# Patient Record
Sex: FEMALE | Race: WHITE | Employment: OTHER | URBAN - METROPOLITAN AREA
[De-identification: names, ages, dates, MRNs, and addresses within clinical notes are randomized per-mention and may not be internally consistent; named-entity substitution may affect disease eponyms.]

---

## 2020-01-14 ENCOUNTER — HOSPITAL ENCOUNTER (OUTPATIENT)
Dept: PHYSICAL THERAPY | Age: 70
Discharge: HOME | End: 2020-01-14
Payer: MEDICARE

## 2020-01-14 PROCEDURE — 97110 THERAPEUTIC EXERCISES: CPT

## 2020-01-14 PROCEDURE — 97162 PT EVAL MOD COMPLEX 30 MIN: CPT

## 2020-01-14 NOTE — PROGRESS NOTES
Madeline Gutierrez 31  [facility] [place] [facility]  [facility]  [place] [facility], [address] [place] [phone], [address]  Phone: [phone]  Fax: [phone] / [address] [facility]  Patient Name: Mercy Mantilla : 1950   Medical   Diagnosis: Low back pain [M54.5] Treatment Diagnosis: LBP   Onset Date: >1 year     Referral Source: Doug Gregory Start of Care [facility]): 2020   Prior Hospitalization: See medical history Provider #: [id number]   Prior Level of Function: Able to walk community distances without an AD   Comorbidities: HX dizziness   Medications: Verified on Patient Summary List   The Plan of Care and following information is based on the information from the initial evaluation.   ===========================================================================================  Assessment / key information: Patient is a 71 y.o. female who presents to [facility] with diagnosis of LBP and imbalance. The patient reports chronic HX of LBP that has progressively worsened. She reports pain is worse when walking and static standing. She has started using a cane when walking longer distances (>100ft) due to LBP and gait instability. She reports she \"waddles\" when walking and often feels dizzy when up and moving around. She also reports episodes of R LE radicular pain 1-2x/week when walking but is able to alleviate SX if she sits for 10-15 min. She also reports B knee pain (R>L) and has received cortisone injections in R knee, most recent . Objective PT examination findings include:   Movement Assessment: amb with inc ELLEN, fair balance during transfers  AROM: L/S AROM flex 50%, ext 25% p!, B SB 40% R ERP with poor lumbar curve reversal  MMT: fair TA recruitment in H/L, B hip abd/ER 3+/5, poor segmental recruitment during H/L  Special Tests: + R Slump/SLR for neural tension, B SLS <3 sec, B mSR <10sec with poor balance strategy, SX reproduction during horizontal head turns with and without visual fixation in standing    A home exercise program was demonstrated and provided to address the above objective and functional deficits. Patient can benefit from skilled PT interventions to improve strength, decrease pain, to facilitate performance of ADLs & improve overall functional status.   ===========================================================================================  Eval Complexity: History MEDIUM  Complexity : 1-2 comorbidities / personal factors will impact the outcome/ POC ;  Examination  MEDIUM Complexity : 3 Standardized tests and measures addressing body structure, function, activity limitation and / or participation in recreation ; Presentation MEDIUM Complexity : Evolving with changing characteristics ; Decision Making MEDIUM Complexity : FOTO score of 26-74; Overall Complexity MEDIUM  Problem List: pain affecting function, decrease ROM, decrease strength, impaired gait/ balance, decrease ADL/ functional abilitiies, decrease activity tolerance, decrease flexibility/ joint mobility and decrease transfer abilities, FOTO score 40  Treatment Plan may include any combination of the following: Therapeutic exercise, Therapeutic activities, Neuromuscular re-education, Physical agent/modality, Gait/balance training, Manual therapy including dry needling, Aquatic therapy and Patient education  Patient / Family readiness to learn indicated by: asking questions, trying to perform skills and interest  Persons(s) to be included in education: patient (P)  Barriers to Learning/Limitations: no  Measures taken:    Patient Goal (s): \"reduction in pain, walk w/o cane\"   Patient self reported health status: fair  Rehabilitation Potential: good   Short Term Goals: To be accomplished in  1-2  weeks:  1) Patient to be independent and compliant with initial HEP to prevent further disability.     2) Patient to improve TA strength in order to perform and maintain ab draw during bed mobility. 3) Patient will report decreased c/o pain to < or = 2/10 to facilitate ease with standing 10 min with manageable sx in L/S.   Long Term Goals: To be accomplished in  3-4  weeks:  1) Patient to be independent & compliant with a progressive, high level HEP in order to maintain gains made in physical therapy. 2) Improve FOTO score to 54 indicating significant functional improvement in order to return to PLOF and community ambulation. 3) Able to walk 10ft without an AD and no inc in LBP indicating ability to return to community ambulation. Frequency / Duration:   Patient to be seen  2-3  times per week for 3-4  weeks:  Patient / Caregiver education and instruction: self care, activity modification and exercises    Therapist Signature: Madhavi Collazo PT, DPT, MTC, CMTPT Date: 8/84/4220   Certification Period: 1/14/2020 to 4/12/2020 Time: 3:07 PM   ===========================================================================================  I certify that the above Physical Therapy Services are being furnished while the patient is under my care. I agree with the treatment plan and certify that this therapy is necessary. Physician Signature:        Date:       Time:     Please sign and return to [facility] or you may fax the signed copy to [phone]. Thank you.

## 2020-01-14 NOTE — PROGRESS NOTES
PHYSICAL THERAPY - DAILY TREATMENT NOTE    Patient Name: Brandon Contreras        Date: 2020  : 1950   YES Patient  Verified  Visit #:     Insurance: Payor: Zackary Richardson / Plan: [address] [place] [address] HMO / Product Type: Managed Care Medicare /      In time: 1010 Out time: 1100   Total Treatment Time: 50     BCBS/Medicare Time Tracking (below)   Total Timed Codes (min):  50 1:1 Treatment Time:  50     TREATMENT AREA =  Low back pain [M54.5]    SUBJECTIVE  Pain Level (on 0 to 10 scale):  6  / 10   Medication Changes/New allergies or changes in medical history, any new surgeries or procedures? NO    If yes, update Summary List   Subjective Functional Status/Changes:  []  No changes reported   The patient reports chronic HX of LBP that has progressively worsened. She reports pain is worse when walking and static standing. She has started using a cane when walking longer distances (>100ft) due to LBP and gait instability. She reports she \"waddles\" when walking and often feels dizzy when up and moving around. She also reports episodes of R LE radicular pain 1-2x/week when walking but is able to alleviate SX if she sits for 10-15 min. She also reports B knee pain (R>L) and has received cortisone injections in R knee, most recent .          Modalities Rationale:     decrease inflammation, decrease pain and increase tissue extensibility to improve patient's ability to perform ADLs   min [] Estim, type/location:                                      []  att     []  unatt     []  w/US     []  w/ice    []  w/heat    min []  Mechanical Traction: type/lbs                   []  pro   []  sup   []  int   []  cont    []  before manual    []  after manual    min []  Ultrasound, settings/location:      min []  Iontophoresis w/ dexamethasone, location:                                               []  take home patch       []  in clinic    min []  Ice     []  Heat    location/position:     min [] Vasopneumatic Device, press/temp:     min []  Other:    [x] Skin assessment post-treatment (if applicable):    [x]  intact    [x]  redness- no adverse reaction     []redness  adverse reaction:        10 min Therapeutic Exercise:  [x]  See flow sheet   Rationale:      increase ROM and increase strength to improve the patients ability to perform unlimited ADLs       Billed With/As:   [] TE   [] TA   [] Neuro   [] Self Care Patient Education: [x] Review HEP    [] Progressed/Changed HEP based on:   [] positioning   [] body mechanics   [] transfers   [] heat/ice application    [x] other: Issued written HEP and reviewed     Other Objective/Functional Measures: Movement Assessment: amb with inc ELLEN, fair balance during transfers  AROM: L/S AROM flex 50%, ext 25% p!, B SB 40% with poor lumbar curve reversal  MMT: fair TA recruitment in H/L, B hip abd/ER 3+/5, poor segmental recruitment during H/L  Special Tests: B SLS <3 sec, B mSR <10sec with poor balance strategy, SX reproduction during horizontal head turns with and without visual fixation in standing     Post Treatment Pain Level (on 0 to 10) scale:   6  / 10     ASSESSMENT  Assessment/Changes in Function:     See POC     []  See Progress Note/Recertification   Patient will continue to benefit from skilled PT services to modify and progress therapeutic interventions, address functional mobility deficits, address ROM deficits, address strength deficits, analyze and address soft tissue restrictions, analyze and cue movement patterns, analyze and modify body mechanics/ergonomics, assess and modify postural abnormalities, address imbalance/dizziness and instruct in home and community integration to attain remaining goals.    Progress toward goals / Updated goals:    Progressing towards newly established goals in [address] [facility] [address]  [x]  Upgrade activities as tolerated YES Continue plan of care   []  Discharge due to :    []  Other:      Therapist: Madhavi Collazo, PT, DPT, MTC, CMTPT    Date: 1/14/2020 Time: 10:14 AM     No future appointments.

## 2020-01-21 ENCOUNTER — HOSPITAL ENCOUNTER (OUTPATIENT)
Dept: PHYSICAL THERAPY | Age: 70
Discharge: HOME | End: 2020-01-21
Payer: MEDICARE

## 2020-01-21 PROCEDURE — 97110 THERAPEUTIC EXERCISES: CPT

## 2020-01-21 NOTE — PROGRESS NOTES
PHYSICAL THERAPY - DAILY TREATMENT NOTE    Patient Name: Ingrid Wakefield        Date: 2020  : 1950   YES Patient  Verified  Visit #:      of   12  Insurance: Payor: Bobby Huerta / Plan: [address] [place] [address] HMO / Product Type: Managed Care Medicare /      In time: 11:05 A Out time: 12:05 P   Total Treatment Time: 55     BCBS/Medicare Time Tracking (below)   Total Timed Codes (min):  45 1:1 Treatment Time:  30     TREATMENT AREA =  Low back pain [M54.5]  Gait instability [R26.81]    SUBJECTIVE  Pain Level (on 0 to 10 scale):  0  / 10   Medication Changes/New allergies or changes in medical history, any new surgeries or procedures? NO    If yes, update Summary List   Subjective Functional Status/Changes:  []  No changes reported     Patient reports that she is not having much pain prior to therapy. She reports that the pain gets worse when she walks for longer distances. Patient reports that she uses a [facility] for longer distances. Modalities Rationale:     decrease pain and increase tissue extensibility to improve patient's ability to walk longer distances with reduced pain.    min [] Estim, type/location:                                      []  att     []  unatt     []  w/US     []  w/ice    []  w/heat    min []  Mechanical Traction: type/lbs                   []  pro   []  sup   []  int   []  cont    []  before manual    []  after manual    min []  Ultrasound, settings/location:      min []  Iontophoresis w/ dexamethasone, location:                                               []  take home patch       []  in clinic   10 min []  Ice     [x]  Heat    location/position: Supine lumbar spine    min []  Vasopneumatic Device, press/temp:     min []  Other:    [x] Skin assessment post-treatment (if applicable):    [x]  intact    [x]  redness- no adverse reaction     []redness  adverse reaction:        45/ 30 min Therapeutic Exercise:  [x]  See flow sheet   Rationale:      increase ROM and increase strength to improve the patients ability to perform ADLs with reduced pain. Billed With/As:   [] TE   [] TA   [] Neuro   [] Self Care Patient Education: [x] Review HEP    [] Progressed/Changed HEP based on:   [] positioning   [] body mechanics   [] transfers   [] heat/ice application    [] other:      Other Objective/Functional Measures:    Initiated exercises per flowsheet. Added PPT, SB isometrics, and clamshells to address core stability and hip strengthening. Post Treatment Pain Level (on 0 to 10) scale:   0  / 10     ASSESSMENT  Assessment/Changes in Function:     Good tolerance to exercises today with no c/o pain following treatment. Patient required verbal and tactile cueing for proper initiation of TA draw and PPT. Cueing also needed for proper form and performance of exercises. []  See Progress Note/Recertification   Patient will continue to benefit from skilled PT services to modify and progress therapeutic interventions, address functional mobility deficits, address ROM deficits, address strength deficits, analyze and cue movement patterns, analyze and modify body mechanics/ergonomics, assess and modify postural abnormalities and instruct in home and community integration to attain remaining goals. Progress toward goals / Updated goals:    Progressing toward STGs 2 & 3.      PLAN  [x]  Upgrade activities as tolerated YES Continue plan of care   []  Discharge due to :    []  Other:      Therapist: Samantha Redman    Date: 1/21/2020 Time: 2:44 PM     Future Appointments   Date Time Provider Joao Mann   1/24/2020 10:30 AM Cm [facility]   1/27/2020 12:30 PM [facility]m [facility]   1/30/2020 12:00 PM Enmanuel Foley [facility]

## 2020-01-24 ENCOUNTER — HOSPITAL ENCOUNTER (OUTPATIENT)
Dept: PHYSICAL THERAPY | Age: 70
Discharge: HOME | End: 2020-01-24
Payer: MEDICARE

## 2020-01-24 PROCEDURE — 97112 NEUROMUSCULAR REEDUCATION: CPT

## 2020-01-24 NOTE — PROGRESS NOTES
PHYSICAL THERAPY - DAILY TREATMENT NOTE    Patient Name: Theresa Lopez        Date: 2020  : 1950   YES Patient  Verified  Visit #:   3   of   12  Insurance: Payor: Annette Simmonds / Plan: [address] [place] [address] HMO / Product Type: Managed Care Medicare /      In time: 10:30 A Out time: 11:10 A   Total Treatment Time: 40     BCBS/Medicare Time Tracking (below)   Total Timed Codes (min):  40 1:1 Treatment Time:  40     TREATMENT AREA = Low back pain [M54.5]  Gait instability [R26.81]    SUBJECTIVE  Pain Level (on 0 to 10 scale):  0  / 10   Medication Changes/New allergies or changes in medical history, any new surgeries or procedures? NO    If yes, update Summary List   Subjective Functional Status/Changes:  []  No changes reported     Patient reports dizziness is intermittent in nature & now more frequent in nature, it worsens when she moves her head changes her position too quickly.             Modalities Rationale:    Patient deferred   min [] Estim, type/location:                                      []  att     []  unatt     []  w/US     []  w/ice    []  w/heat    min []  Mechanical Traction: type/lbs                   []  pro   []  sup   []  int   []  cont    []  before manual    []  after manual    min []  Ultrasound, settings/location:      min []  Iontophoresis w/ dexamethasone, location:                                               []  take home patch       []  in clinic    min []  Ice     []  Heat    location/position:     min []  Vasopneumatic Device, press/temp:     min []  Other:    [] Skin assessment post-treatment (if applicable):    []  intact    []  redness- no adverse reaction     []redness  adverse reaction:        40 min NM re-ed:  [x]  See flow sheet   Rationale:      improve coordination, improve balance and increase proprioception to improve the patients ability to return to symptom-free ADLs     Billed With/As:   [] TE   [] TA   [] Neuro   [] Self Care Patient Education: [x] Review HEP    [] Progressed/Changed HEP based on:   [] positioning   [] body mechanics   [] transfers   [] heat/ice application    [] other:      Other Objective/Functional Measures:    [place]ane 49/56  FGTONA 20/30  Reviewed current treatment & HEP as well as symptom management at home     Post Treatment Pain Level (on 0 to 10) scale:   0  / 10     ASSESSMENT  Assessment/Changes in Function:     Good tolerance to initiation of vest adaptation exercises, increased symptoms with HHT     []  See Progress Note/Recertification   Patient will continue to benefit from skilled PT services to modify and progress therapeutic interventions, address functional mobility deficits, address ROM deficits, address strength deficits, assess and modify postural abnormalities, address imbalance/dizziness and instruct in home and community integration to attain remaining goals.    Progress toward goals / Updated goals:    Progressing towards STG 1     PLAN  []  Upgrade activities as tolerated YES Continue plan of care   []  Discharge due to :    []  Other:      Therapist: Yuki Wells, PT    Date: 1/24/2020 Time: 10:37 AM     Future Appointments   Date Time Provider Joao Mann   1/27/2020 12:30 PM Brenden Monzon [facility]   1/30/2020 12:00 PM Shantell Davis [facility]

## 2020-01-27 ENCOUNTER — HOSPITAL ENCOUNTER (OUTPATIENT)
Dept: PHYSICAL THERAPY | Age: 70
Discharge: HOME | End: 2020-01-27
Payer: MEDICARE

## 2020-01-27 ENCOUNTER — APPOINTMENT (OUTPATIENT)
Dept: PHYSICAL THERAPY | Age: 70
End: 2020-01-27
Payer: MEDICARE

## 2020-01-27 PROCEDURE — 97110 THERAPEUTIC EXERCISES: CPT

## 2020-01-27 PROCEDURE — 97112 NEUROMUSCULAR REEDUCATION: CPT

## 2020-01-27 NOTE — PROGRESS NOTES
PHYSICAL THERAPY - DAILY TREATMENT NOTE    Patient Name: Sam Dey        Date: 2020  : 1950   YES Patient  Verified  Visit #:      12  Insurance: Payor: Christie Timothy / Plan: [address] [place] [address] HMO / Product Type: Managed Care Medicare /      In time: 12:30 P Out time: 11:40 A   Total Treatment Time: 65     BCBS/Medicare Time Tracking (below)   Total Timed Codes (min):  60 1:1 Treatment Time:  40     TREATMENT AREA = Low back pain [M54.5]  Gait instability [R26.81]    SUBJECTIVE  Pain Level (on 0 to 10 scale):  0  / 10   Medication Changes/New allergies or changes in medical history, any new surgeries or procedures? NO    If yes, update Summary List   Subjective Functional Status/Changes:  []  No changes reported     Patient reports she is doing VSE card exercise bid.           Modalities Rationale:     decrease pain to improve patient's ability to return to pain-free ADLs   min [] Estim, type/location:                                      []  att     []  unatt     []  w/US     []  w/ice    []  w/heat    min []  Mechanical Traction: type/lbs                   []  pro   []  sup   []  int   []  cont    []  before manual    []  after manual    min []  Ultrasound, settings/location:      min []  Iontophoresis w/ dexamethasone, location:                                               []  take home patch       []  in clinic   5 min []  Ice     [x]  Heat    location/position: Supine to l/s     min []  Vasopneumatic Device, press/temp:     min []  Other:    [] Skin assessment post-treatment (if applicable):    [x]  intact    []  redness- no adverse reaction     []redness  adverse reaction:        35/  15 min Therapeutic Exercise:  [x]  See flow sheet   Rationale:      increase ROM and increase strength to improve the patients ability to return to pain-free standing     25 min Neuromuscular Re-ed: [x]  See flow sheet   Rationale:    increase ROM, increase strength, improve balance and increase proprioception to improve the patients ability to return to ADLs with manageable symptoms     Billed With/As:   [] TE   [] TA   [] Neuro   [] Self Care Patient Education: [x] Review HEP    [] Progressed/Changed HEP based on:   [] positioning   [] body mechanics   [] transfers   [] heat/ice application    [] other:      Other Objective/Functional Measures: Added static balance exercises per flow sheet      Post Treatment Pain Level (on 0 to 10) scale:   0  / 10     ASSESSMENT  Assessment/Changes in Function:     Good tolerance to initial program      []  See Progress Note/Recertification   Patient will continue to benefit from skilled PT services to modify and progress therapeutic interventions, address functional mobility deficits, address ROM deficits, address strength deficits, analyze and modify body mechanics/ergonomics, assess and modify postural abnormalities, address imbalance/dizziness and instruct in home and community integration to attain remaining goals.    Progress toward goals / Updated goals:    Progressing towards STG 2, 3      PLAN  []  Upgrade activities as tolerated YES Continue plan of care   []  Discharge due to :    []  Other:      Therapist: Susan Antony PT    Date: 1/27/2020 Time: 3:48 PM     Future Appointments   Date Time Provider Joao Mann   1/30/2020 12:00 PM [facility]

## 2020-01-28 ENCOUNTER — APPOINTMENT (OUTPATIENT)
Dept: PHYSICAL THERAPY | Age: 70
End: 2020-01-28
Payer: MEDICARE

## 2020-01-30 ENCOUNTER — HOSPITAL ENCOUNTER (OUTPATIENT)
Dept: PHYSICAL THERAPY | Age: 70
Discharge: HOME | End: 2020-01-30
Payer: MEDICARE

## 2020-01-30 ENCOUNTER — APPOINTMENT (OUTPATIENT)
Dept: PHYSICAL THERAPY | Age: 70
End: 2020-01-30
Payer: MEDICARE

## 2020-01-30 PROCEDURE — 97110 THERAPEUTIC EXERCISES: CPT

## 2020-01-30 PROCEDURE — 97112 NEUROMUSCULAR REEDUCATION: CPT

## 2020-01-30 NOTE — PROGRESS NOTES
PHYSICAL THERAPY - DAILY TREATMENT NOTE    Patient Name: Jasmyne Vincent        Date: 2020  : 1950   YES Patient  Verified  Visit #:     Insurance: Payor: Eri Evans / Plan: [address] [place] [address] HMO / Product Type: Managed Care Medicare /      In time: 1200 Out time: 85   Total Treatment Time: 55     BCBS/Medicare Time Tracking (below)   Total Timed Codes (min):  55 1:1 Treatment Time:  40     TREATMENT AREA =  Low back pain [M54.5]  Gait instability [R26.81]    SUBJECTIVE  Pain Level (on 0 to 10 scale):  0  / 10   Medication Changes/New allergies or changes in medical history, any new surgeries or procedures? NO    If yes, update Summary List   Subjective Functional Status/Changes:  []  No changes reported     Patient reports that she was able to walk in the grocery store after last visit without a cart without pain.             Modalities Rationale:   PD    min [] Estim, type/location:                                      []  att     []  unatt     []  w/US     []  w/ice    []  w/heat    min []  Mechanical Traction: type/lbs                   []  pro   []  sup   []  int   []  cont    []  before manual    []  after manual    min []  Ultrasound, settings/location:      min []  Iontophoresis w/ dexamethasone, location:                                               []  take home patch       []  in clinic    min []  Ice     []  Heat    location/position:     min []  Vasopneumatic Device, press/temp:     min []  Other:    [x] Skin assessment post-treatment (if applicable):    [x]  intact    [x]  redness- no adverse reaction     []redness  adverse reaction:        40/25 min Therapeutic Exercise:  [x]  See flow sheet   Rationale:      increase ROM, increase strength, improve coordination, improve balance and increase proprioception to improve the patients ability to perform unlimited ADLs       15 min Neuromuscular Re-ed: [x]  See flow sheet   Rationale:    increase ROM, increase strength, improve coordination, improve balance and increase proprioception to improve the patients ability to perform unlimited ambulation        Billed With/As:   [] TE   [] TA   [] Neuro   [] Self Care Patient Education: [x] Review HEP    [] Progressed/Changed HEP based on:   [] positioning   [] body mechanics   [] transfers   [] heat/ice application    [] other:      Other Objective/Functional Measures: Added gait with HHT, and VHT  Progressed VSE to sitting  Progressed various balance exercises per flow sheet     Post Treatment Pain Level (on 0 to 10) scale:   0  / 10     ASSESSMENT  Assessment/Changes in Function:     Patient is making good progress with PT rx with no increase in overall pain with exercises. She is most challenged with balance exercises focused on challenged the vestibular system     []  See Progress Note/Recertification   Patient will continue to benefit from skilled PT services to modify and progress therapeutic interventions, address functional mobility deficits, address ROM deficits, address strength deficits, analyze and address soft tissue restrictions, analyze and cue movement patterns, analyze and modify body mechanics/ergonomics, assess and modify postural abnormalities, address imbalance/dizziness and instruct in home and community integration to attain remaining goals. Progress toward goals / Updated goals:    Progressing towards LTG 2.       PLAN  [x]  Upgrade activities as tolerated YES Continue plan of care   []  Discharge due to :    []  Other:      Therapist: John Rodriguez    Date: 1/30/2020 Time: 1:21 PM     Future Appointments   Date Time Provider Joao Mann   2/3/2020 11:00 AM [facility]   2/6/2020  2:30 PM [facility]   2/11/2020 11:30 AM [facility]   2/13/2020  2:30 PM [facility]   2/17/2020  2:30 PM [facility]   2/20/2020  2:30 PM L[facility] 2/24/2020 11:00 AM [facility]   [facility]

## 2020-02-03 ENCOUNTER — HOSPITAL ENCOUNTER (OUTPATIENT)
Dept: PHYSICAL THERAPY | Age: 70
Discharge: HOME | End: 2020-02-03
Payer: MEDICARE

## 2020-02-03 PROCEDURE — 97110 THERAPEUTIC EXERCISES: CPT

## 2020-02-03 PROCEDURE — 97112 NEUROMUSCULAR REEDUCATION: CPT

## 2020-02-03 NOTE — PROGRESS NOTES
PHYSICAL THERAPY - DAILY TREATMENT NOTE    Patient Name: Ascencion Johnson        Date: 2/3/2020  : 1950   YES Patient  Verified  Visit #:     Insurance: Payor: Marci Maureen / Plan: [address] [place] [address] HMO / Product Type: Managed Care Medicare /      In time: 1100 Out time: 4286   Total Treatment Time: 45     BCBS/Medicare Time Tracking (below)   Total Timed Codes (min):  45 1:1 Treatment Time:  25     TREATMENT AREA =  Low back pain [M54.5]  Gait instability [R26.81]    SUBJECTIVE  Pain Level (on 0 to 10 scale):  0  / 10   Medication Changes/New allergies or changes in medical history, any new surgeries or procedures? NO    If yes, update Summary List   Subjective Functional Status/Changes:  []  No changes reported     Patient reports that she was again able to walk for about 45 minutes after therapy last time without a cane or having to reach out for objects.             Modalities Rationale:    PD    min [] Estim, type/location:                                      []  att     []  unatt     []  w/US     []  w/ice    []  w/heat    min []  Mechanical Traction: type/lbs                   []  pro   []  sup   []  int   []  cont    []  before manual    []  after manual    min []  Ultrasound, settings/location:      min []  Iontophoresis w/ dexamethasone, location:                                               []  take home patch       []  in clinic    min []  Ice     []  Heat    location/position:     min []  Vasopneumatic Device, press/temp:     min []  Other:    [x] Skin assessment post-treatment (if applicable):    [x]  intact    [x]  redness- no adverse reaction     []redness  adverse reaction:        30/10 min Therapeutic Exercise:  [x]  See flow sheet   Rationale:      increase ROM, increase strength, improve coordination, improve balance and increase proprioception to improve the patients ability to perform unlimited ADLs       15 min Neuromuscular Re-ed: [x]  See flow sheet Rationale:    increase ROM, increase strength, improve coordination, improve balance and increase proprioception to improve the patients ability to perform unlimited ADLs         Billed With/As:   [] TE   [] TA   [] Neuro   [] Self Care Patient Education: [x] Review HEP    [] Progressed/Changed HEP based on:   [] positioning   [] body mechanics   [] transfers   [] heat/ice application    [] other:      Other Objective/Functional Measures:    Progressed VVI to standing, VSE to standing with decreased base of support. Required rest breaks between each set due to increase in sx. Progressed to MSR with EC on firm ground     Post Treatment Pain Level (on 0 to 10) scale:   0  / 10     ASSESSMENT  Assessment/Changes in Function:     Patient is making good progress with balance and core stability exercises with no increase in overall pain. She is showing slight improvements in use of hip balance strategies. []  See Progress Note/Recertification   Patient will continue to benefit from skilled PT services to modify and progress therapeutic interventions, address functional mobility deficits, address ROM deficits, address strength deficits, analyze and address soft tissue restrictions, analyze and cue movement patterns, analyze and modify body mechanics/ergonomics, assess and modify postural abnormalities, address imbalance/dizziness and instruct in home and community integration to attain remaining goals. Progress toward goals / Updated goals:    Progressing towards LTG 2.       PLAN  [x]  Upgrade activities as tolerated YES Continue plan of care   []  Discharge due to :    []  Other:      Therapist: John Rodriguez    Date: 2/3/2020 Time: 12:18 PM     Future Appointments   Date Time Provider Joao Mann   2/6/2020  2:30 PM [facility]   2/11/2020 11:30 AM [facility]   2/13/2020  2:30 PM [facility]   [facility]17/2020  [facility]/20/2020  2:30 PM [facility]   2/24/2020 11:00 AM [facility]   2/27/2020 11:30 AM [facility]

## 2020-02-06 ENCOUNTER — APPOINTMENT (OUTPATIENT)
Dept: PHYSICAL THERAPY | Age: 70
End: 2020-02-06
Payer: MEDICARE

## 2020-02-11 ENCOUNTER — HOSPITAL ENCOUNTER (OUTPATIENT)
Dept: PHYSICAL THERAPY | Age: 70
Discharge: HOME | End: 2020-02-11
Payer: MEDICARE

## 2020-02-11 PROCEDURE — 97112 NEUROMUSCULAR REEDUCATION: CPT

## 2020-02-11 NOTE — PROGRESS NOTES
Madeline Gutierrez [address]  [facility] PHYSICAL THERAPY  [employer]  [facility], [address] ,[address], [address] [facility]  Phone: [phone]  Fax: [phone]  PROGRESS NOTE  Patient Name: Jhoana Tidwell : 1950   Treatment/Medical Diagnosis: Low back pain [M54.5]  Gait instability [R26.81]   Referral Source: Rebecca Rodriguez,*     Date of Initial Visit: 2020 Attended Visits: 7 Missed Visits: 1     SUMMARY OF TREATMENT  Therapeutic exercise to increase core stability, lumbar flexibility, LE strength, postural control. Neuromuscular re-education to improve balance and decrease symptoms in order to perform ADLs and dressing. CURRENT STATUS  Ms. Yi is making good progress with physical therapy treatment. She continues to have unsteadiness on her feet as well as complaints of frequent, intermittent dizziness throughout the day associated with quick position changes as well as head turns. She is tolerated VSE and VVI card exercises in standing well but does require UE support. She has slow improvements in use of hip and ankle balance strategy but tends to rely heavily on UE and stepping strategy. Today FGA score is 21/30, unable to maintain SR for > 3 seconds bilaterally, balance EC on foam: 30s with increased sway requiring SBA. Overall she reports her lower back pain has improved and she is able to walk short distances (grocery shopping) without use of SPC or cart because her lower back is bothering her. Patient agreeable to focus second half of therapy on imbalance. Goal/Measure of Progress Goal Met? 1. Patient to be independent & compliant with a progressive, high level HEP in order to maintain gains made in physical therapy. Status at last Eval: established Current Status: I and compliant  yes   2. Improve FOTO score to 54 indicating significant functional improvement in order to return to PLOF and community ambulation.    Status at last Eval: 40 Current Status: 63 yes   3. Able to walk 10ft without an AD and no inc in LBP indicating ability to return to community ambulation. Status at last Eval: unable Current Status: Able to walk 30ft x 10 without AD without LBP yes     New Goals to be achieved in __4-6__  weeks:  1. Patient will be I and compliant with a vestibular card exercise program 3x a day in order to prevent further symptoms. 2.  Patient will increase FGA score to 24/30 in order to improve ease with community ambulation   3. Patient will be able to maintain SR for 16 seconds with good use of hip balance strategy in order to prevent falls within the home. 4.  Patient will report >/= 50% improvement in overall sx in order to allow for ease with ADLs. RECOMMENDATIONS  Patient to be seen 2x a week for 4-6 weeks to work towards LTG. If you have any questions/comments please contact us directly at [phone]. Thank you for allowing us to assist in the care of your patient. Therapist Signature: Kuldip Stoner PT DPT Date: 2/11/2020     Time: 11:38 AM   NOTE TO PHYSICIAN:  PLEASE COMPLETE THE ORDERS BELOW AND FAX TO   [facility]: [phone]. If you are unable to process this request in 24 hours please contact our office: [phone].    ___ I have read the above report and request that my patient continue as recommended.   ___ I have read the above report and request that my patient continue therapy with the following changes/special instructions:_________________________________________________________   ___ I have read the above report and request that my patient be discharged from therapy.      Physician Signature:        Date:       Time:

## 2020-02-11 NOTE — PROGRESS NOTES
PHYSICAL THERAPY - DAILY TREATMENT NOTE    Patient Name: Butch Sanderson        Date: 2020  : 1950   YES Patient  Verified  Visit #:     Insurance: Payor: Rakel Stephener / Plan: [address] [place] [address] HMO / Product Type: Managed Care Medicare /      In time:  Out time:    Total Treatment Time: 40     BCBS/Medicare Time Tracking (below)   Total Timed Codes (min):  40 1:1 Treatment Time:  25     TREATMENT AREA =  Low back pain [M54.5]  Gait instability [R26.81]    SUBJECTIVE  Pain Level (on 0 to 10 scale):  0  / 10   Medication Changes/New allergies or changes in medical history, any new surgeries or procedures? NO    If yes, update Summary List   Subjective Functional Status/Changes:  []  No changes reported     Patient reports that her back has been feeling better but today is an off day in regards to her dizziness.             Modalities Rationale:   NA    min [] Estim, type/location:                                      []  att     []  unatt     []  w/US     []  w/ice    []  w/heat    min []  Mechanical Traction: type/lbs                   []  pro   []  sup   []  int   []  cont    []  before manual    []  after manual    min []  Ultrasound, settings/location:      min []  Iontophoresis w/ dexamethasone, location:                                               []  take home patch       []  in clinic    min []  Ice     []  Heat    location/position:     min []  Vasopneumatic Device, press/temp:     min []  Other:    [x] Skin assessment post-treatment (if applicable):    [x]  intact    [x]  redness- no adverse reaction     []redness  adverse reaction:        10 NB min Therapeutic Exercise:  [x]  See flow sheet   Rationale:      increase ROM, increase strength, improve coordination, improve balance and increase proprioception to improve the patients ability to perform unlimited ADLs     30/25 min Neuromuscular Re-ed: [x]  See flow sheet   Rationale:    increase ROM, increase strength, improve coordination, improve balance and increase proprioception to improve the patients ability to perform unlimited ADLs     Billed With/As:   [] TE   [] TA   [] Neuro   [] Self Care Patient Education: [x] Review HEP    [] Progressed/Changed HEP based on:   [] positioning   [] body mechanics   [] transfers   [] heat/ice application    [] other:      Other Objective/Functional Measures:    FGA: 21/30     Post Treatment Pain Level (on 0 to 10) scale:   0  / 10     ASSESSMENT  Assessment/Changes in Function:     See PN, patient is making good progress with PT rx with slow improvements in overall use of hip balance strategy. Unable to progress exercises today due to overall increase of sx.      []  See Progress Note/Recertification   Patient will continue to benefit from skilled PT services to modify and progress therapeutic interventions, address functional mobility deficits, address ROM deficits, address strength deficits, analyze and address soft tissue restrictions, analyze and cue movement patterns, analyze and modify body mechanics/ergonomics, assess and modify postural abnormalities, address imbalance/dizziness and instruct in home and community integration to attain remaining goals. Progress toward goals / Updated goals:    See PN for newly established LTg.       PLAN  [x]  Upgrade activities as tolerated YES Continue plan of care   []  Discharge due to :    []  Other:      Therapist: Rogelio Ovalle    Date: 2/11/2020 Time: 12:27 PM     Future Appointments   Date Time Provider Joao Mann   2/13/2020  2:30 PM [facility]   2/17/2020  2:30 PM [facility]   2/20/2020  2:30 PM [facility]   2/24/2020 11:00 AM [facility]

## 2020-02-13 ENCOUNTER — HOSPITAL ENCOUNTER (OUTPATIENT)
Dept: PHYSICAL THERAPY | Age: 70
Discharge: HOME | End: 2020-02-13
Payer: MEDICARE

## 2020-02-13 PROCEDURE — 97140 MANUAL THERAPY 1/> REGIONS: CPT

## 2020-02-13 PROCEDURE — 97110 THERAPEUTIC EXERCISES: CPT

## 2020-02-13 PROCEDURE — 97112 NEUROMUSCULAR REEDUCATION: CPT

## 2020-02-17 ENCOUNTER — HOSPITAL ENCOUNTER (OUTPATIENT)
Dept: PHYSICAL THERAPY | Age: 70
Discharge: HOME | End: 2020-02-17
Payer: MEDICARE

## 2020-02-17 PROCEDURE — 97112 NEUROMUSCULAR REEDUCATION: CPT

## 2020-02-17 PROCEDURE — 97110 THERAPEUTIC EXERCISES: CPT

## 2020-02-17 NOTE — PROGRESS NOTES
PHYSICAL THERAPY - DAILY TREATMENT NOTE    Patient Name: Jade Slater        Date: 2020  : 1950   YES Patient  Verified  Visit #:     Insurance: Payor: Valerie Turner / Plan: [address] [place] [address] HMO / Product Type: Managed Care Medicare /      In time: 2:30 P Out time: 3:10 P   Total Treatment Time: 40     BCBS/Medicare Time Tracking (below)   Total Timed Codes (min):  40 1:1 Treatment Time:  40     TREATMENT AREA =  Low back pain [M54.5]  Gait instability [R26.81]    SUBJECTIVE  Pain Level (on 0 to 10 scale):  0  / 10   Medication Changes/New allergies or changes in medical history, any new surgeries or procedures? NO    If yes, update Summary List   Subjective Functional Status/Changes:  []  No changes reported     Patient reports that she was having a bad morning with nausea. 10 min Therapeutic Exercise:  [x]  See flow sheet   Rationale:      increase ROM and increase strength to improve the patients ability to perform ADLs. 30 min Neuromuscular Re-ed: [x]  See flow sheet   Rationale:    improve balance to improve the patients ability to ambulate with improved stability. Billed With/As:   [] TE   [] TA   [] Neuro   [] Self Care Patient Education: [x] Review HEP    [] Progressed/Changed HEP based on:   [] positioning   [] body mechanics   [] transfers   [] heat/ice application    [] other:      Other Objective/Functional Measures:    Progressed balance activities. Post Treatment Pain Level (on 0 to 10) scale:   0  / 10     ASSESSMENT  Assessment/Changes in Function:     Good tolerance to progression of exercises today with noted improvement in balancing. Patient demonstrates improved stability with balancing activities with less guarding required. Continue to progress as able.      []  See Progress Note/Recertification   Patient will continue to benefit from skilled PT services to modify and progress therapeutic interventions, address functional mobility deficits, address strength deficits, analyze and cue movement patterns, analyze and modify body mechanics/ergonomics, assess and modify postural abnormalities, address imbalance/dizziness and instruct in home and community integration to attain remaining goals. Progress toward goals / Updated goals:    Steadily progressing toward goals 2&3.      PLAN  [x]  Upgrade activities as tolerated YES Continue plan of care   []  Discharge due to :    []  Other:      Therapist: Guille Green    Date: 2/17/2020 Time: 4:12 PM     Future Appointments   Date Time Provider Joao Mann   2/24/2020 11:00 AM [facility]

## 2020-02-20 ENCOUNTER — APPOINTMENT (OUTPATIENT)
Dept: PHYSICAL THERAPY | Age: 70
End: 2020-02-20
Payer: MEDICARE

## 2020-02-24 ENCOUNTER — HOSPITAL ENCOUNTER (OUTPATIENT)
Dept: PHYSICAL THERAPY | Age: 70
Discharge: HOME | End: 2020-02-24
Payer: MEDICARE

## 2020-02-24 PROCEDURE — 97112 NEUROMUSCULAR REEDUCATION: CPT

## 2020-02-24 PROCEDURE — 97110 THERAPEUTIC EXERCISES: CPT

## 2020-02-24 NOTE — PROGRESS NOTES
PHYSICAL THERAPY - DAILY TREATMENT NOTE    Patient Name: Jade Slater        Date: 2020  : 1950   YES Patient  Verified  Visit #:   10   of   12  Insurance: Payor: Valerie Turner / Plan: [address] [place] [address] HMO / Product Type: Managed Care Medicare /      In time: 3914 Out time: 6991   Total Treatment Time: 40     BCBS/Medicare Time Tracking (below)   Total Timed Codes (min):  40 1:1 Treatment Time:  40     TREATMENT AREA =  Low back pain [M54.5]  Gait instability [R26.81]    SUBJECTIVE  Pain Level (on 0 to 10 scale):  0  / 10   Medication Changes/New allergies or changes in medical history, any new surgeries or procedures? NO    If yes, update Summary List   Subjective Functional Status/Changes:  []  No changes reported     Patient reports that she was dizzy over the weekend but thinks shes improving with her balance.             Modalities Rationale:     min [] Estim, type/location:                                      []  att     []  unatt     []  w/US     []  w/ice    []  w/heat    min []  Mechanical Traction: type/lbs                   []  pro   []  sup   []  int   []  cont    []  before manual    []  after manual    min []  Ultrasound, settings/location:      min []  Iontophoresis w/ dexamethasone, location:                                               []  take home patch       []  in clinic    min []  Ice     []  Heat    location/position:     min []  Vasopneumatic Device, press/temp:     min []  Other:    [x] Skin assessment post-treatment (if applicable):    [x]  intact    [x]  redness- no adverse reaction     []redness  adverse reaction:        10 min Therapeutic Exercise:  [x]  See flow sheet   Rationale:      increase ROM, increase strength, improve coordination, improve balance and increase proprioception to improve the patients ability to perform unlimited ADLs          30 min Neuromuscular Re-ed: [x]  See flow sheet   Rationale:    increase ROM, increase strength, improve coordination, improve balance and increase proprioception to improve the patients ability to perform unlimited ADLs         Billed With/As:   [] TE   [] TA   [] Neuro   [] Self Care Patient Education: [x] Review HEP    [] Progressed/Changed HEP based on:   [] positioning   [] body mechanics   [] transfers   [] heat/ice application    [] other:      Other Objective/Functional Measures: Added SLS and toe taps     Post Treatment Pain Level (on 0 to 10) scale:   0  / 10     ASSESSMENT  Assessment/Changes in Function:     Patient is making steady progress with static balance exercises but continues to be challenged with single limb and dynamic balance. []  See Progress Note/Recertification   Patient will continue to benefit from skilled PT services to modify and progress therapeutic interventions, address functional mobility deficits, address ROM deficits, address strength deficits, analyze and address soft tissue restrictions, analyze and cue movement patterns, analyze and modify body mechanics/ergonomics, assess and modify postural abnormalities, address imbalance/dizziness and instruct in home and community integration to attain remaining goals. Progress toward goals / Updated goals:    Steady progress towards LTG 2.       PLAN  [x]  Upgrade activities as tolerated YES Continue plan of care   []  Discharge due to :    []  Other:      Therapist: Samantha Redman    Date: 2/24/2020 Time: 1:59 PM     Future Appointments   Date Time Provider Joao Mann   3/6/2020  9:00 AM [facility]   3/9/2020 10:00 AM [facility]   3/16/2020 10:00 AM [facility]   3/23/2020 10:00 AM [facility]   3/30/2020 10:00 AM [facility]

## 2020-02-27 ENCOUNTER — APPOINTMENT (OUTPATIENT)
Dept: PHYSICAL THERAPY | Age: 70
End: 2020-02-27
Payer: MEDICARE

## 2020-03-06 ENCOUNTER — APPOINTMENT (OUTPATIENT)
Dept: PHYSICAL THERAPY | Age: 70
End: 2020-03-06
Payer: MEDICARE

## 2020-03-09 ENCOUNTER — HOSPITAL ENCOUNTER (OUTPATIENT)
Dept: PHYSICAL THERAPY | Age: 70
Discharge: HOME | End: 2020-03-09
Payer: MEDICARE

## 2020-03-09 PROCEDURE — 97112 NEUROMUSCULAR REEDUCATION: CPT

## 2020-03-09 NOTE — PROGRESS NOTES
PHYSICAL THERAPY - DAILY TREATMENT NOTE    Patient Name: Joanna Joyce        Date: 3/9/2020  : 1950   YES Patient  Verified  Visit #:     Insurance: Payor: Elizabeth Colon / Plan: [address] [place] [address] HMO / Product Type: Managed Care Medicare /      In time: 1000 Out time: 0850   Total Treatment Time: 40     BCBS/Medicare Time Tracking (below)   Total Timed Codes (min):  40 1:1 Treatment Time:  30     TREATMENT AREA =  Low back pain [M54.5]  Gait instability [R26.81]    SUBJECTIVE  Pain Level (on 0 to 10 scale):  0  / 10   Medication Changes/New allergies or changes in medical history, any new surgeries or procedures? NO    If yes, update Summary List   Subjective Functional Status/Changes:  []  No changes reported     Patient reports no falls lately and was able to walk at [employer] without her cane.             Modalities Rationale:   NA    min [] Estim, type/location:                                      []  att     []  unatt     []  w/US     []  w/ice    []  w/heat    min []  Mechanical Traction: type/lbs                   []  pro   []  sup   []  int   []  cont    []  before manual    []  after manual    min []  Ultrasound, settings/location:      min []  Iontophoresis w/ dexamethasone, location:                                               []  take home patch       []  in clinic    min []  Ice     []  Heat    location/position:     min []  Vasopneumatic Device, press/temp:     min []  Other:    [x] Skin assessment post-treatment (if applicable):    [x]  intact    [x]  redness- no adverse reaction     []redness - adverse reaction:        10 NB  min Therapeutic Exercise:  [x]  See flow sheet   Rationale:      increase ROM, increase strength, improve coordination, improve balance and increase proprioception to improve the patients ability to perform unlimited ADls       30 min Neuromuscular Re-ed: [x]  See flow sheet   Rationale:    increase ROM, increase strength, improve coordination, improve balance and increase proprioception to improve the patients ability to perform unlimited ADLs      Billed With/As:   [] TE   [] TA   [] Neuro   [] Self Care Patient Education: [x] Review HEP    [] Progressed/Changed HEP based on:   [] positioning   [] body mechanics   [] transfers   [] heat/ice application    [] other:      Other Objective/Functional Measures:    Able to perform tandem balance for 30 with good use of balance strategies  Fatigue with all standing strengthening exerises reports     Post Treatment Pain Level (on 0 to 10) scale:   0  / 10     ASSESSMENT  Assessment/Changes in Function:     Patient is making good progress with improvements in overall balance strategies      []  See Progress Note/Recertification   Patient will continue to benefit from skilled PT services to modify and progress therapeutic interventions, address functional mobility deficits, address ROM deficits, address strength deficits, analyze and address soft tissue restrictions, analyze and cue movement patterns, analyze and modify body mechanics/ergonomics, assess and modify postural abnormalities and address imbalance/dizziness to attain remaining goals. Progress toward goals / Updated goals:    Progressing towards LTG 2.       PLAN  [x]  Upgrade activities as tolerated YES Continue plan of care   []  Discharge due to :    []  Other:      Therapist: Kimmie Mei    Date: 3/9/2020 Time: 5:59 PM     Future Appointments   Date Time Provider Joao Mann   3/16/2020 10:00 AM Jon[facility] Coom[facility]   3/23/2020 10:00 AM Jonah Coombs [facility]   3/30/2020 10:00 AM Kale Jaimes [facility]

## 2020-03-16 ENCOUNTER — APPOINTMENT (OUTPATIENT)
Dept: PHYSICAL THERAPY | Age: 70
End: 2020-03-16
Payer: MEDICARE

## 2020-03-23 ENCOUNTER — APPOINTMENT (OUTPATIENT)
Dept: PHYSICAL THERAPY | Age: 70
End: 2020-03-23
Payer: MEDICARE

## 2020-03-30 ENCOUNTER — APPOINTMENT (OUTPATIENT)
Dept: PHYSICAL THERAPY | Age: 70
End: 2020-03-30
Payer: MEDICARE

## 2020-12-30 NOTE — PROGRESS NOTES
[address] [place] [address] PHYSICAL THERAPY AT [address] [place] [address]  [facility]all \[facility]\"" [address], [address] [place] [address], [address]  Phone: [phone]  Fax: [phone] FOR PHYSICAL THERAPY          Patient Name: Tran Flores : 1950   Treatment/Medical Diagnosis: Low back pain [M54.5]  Gait instability [R26.81]   Onset Date: >1 year     Referral Source: uLz Alaniz Hickory [facility]): 2020   Prior Hospitalization: See Medical History Provider #: [id number]   Prior Level of Function: Able to walk community distances without an AD   Comorbidities: Hx of dizziness   Medications: Verified on Patient Summary List   Visits from [facility]: 11 Missed Visits: 2       Key Functional Changes/Progress: Ms. Zaria Kingsley was last seen on 3/9/2020 and cancelled remaining follow up appointments due to being ill. She never contacted clinic again and her current status is unknown. Formal reassessment towards goals established on last progress on 2020 was unable to be made. At her last session of physical therapy she reported improved ability to walk throughout stores without needing her cane and she was demonstrating good use of hip and ankle balance strategies. Assessments/Recommendations: Discontinue therapy due to lack of attendance or compliance. If you have any questions/comments please contact us directly at [phone]. Thank you for allowing us to assist in the care of your patient.     Therapist Signature: Melida Gibbs PT DPT  Date: 2020   Reporting Period: 2020- 3/9/2020 Time: 9:20 AM